# Patient Record
Sex: MALE | Race: WHITE | NOT HISPANIC OR LATINO | Employment: STUDENT | URBAN - METROPOLITAN AREA
[De-identification: names, ages, dates, MRNs, and addresses within clinical notes are randomized per-mention and may not be internally consistent; named-entity substitution may affect disease eponyms.]

---
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
## 2022-10-12 ENCOUNTER — OFFICE VISIT (OUTPATIENT)
Dept: PEDIATRICS CLINIC | Age: 22
End: 2022-10-12
Payer: COMMERCIAL

## 2022-10-12 VITALS
DIASTOLIC BLOOD PRESSURE: 80 MMHG | TEMPERATURE: 98.8 F | BODY MASS INDEX: 26.14 KG/M2 | SYSTOLIC BLOOD PRESSURE: 120 MMHG | WEIGHT: 177 LBS

## 2022-10-12 DIAGNOSIS — J02.9 SORE THROAT: Primary | ICD-10-CM

## 2022-10-12 LAB — S PYO AG THROAT QL: NEGATIVE

## 2022-10-12 PROCEDURE — 87880 STREP A ASSAY W/OPTIC: CPT | Performed by: PEDIATRICS

## 2022-10-12 PROCEDURE — 99213 OFFICE O/P EST LOW 20 MIN: CPT | Performed by: PEDIATRICS

## 2022-10-12 NOTE — PROGRESS NOTES
Assessment/Plan:  The rapid strep was negative  Throat culture is pending  Supportive care is recommended  Follow up prn  Needs to have a well visit  Diagnoses and all orders for this visit:    Sore throat  -     POCT rapid strepA  -     Throat culture          Subjective:      Patient ID: Jasmine Rivas is a 23 y o  male  Fever  This is a new problem  The current episode started yesterday  Associated symptoms include anorexia, chills, congestion, coughing, a fever (99 7 was Tmax), myalgias, nausea and a sore throat  Pertinent negatives include no arthralgias, change in bowel habit, headaches, urinary symptoms or vomiting  Associated symptoms comments: Home Covid test was negative    Nothing aggravates the symptoms  He has tried acetaminophen for the symptoms  The treatment provided moderate relief  The following portions of the patient's history were reviewed and updated as appropriate:   He  has a past medical history of Anorexia nervosa (9/18/2017) and Hematuria (7/18/2019)  He   Patient Active Problem List    Diagnosis Date Noted   • Acute nonintractable headache 01/27/2020   • Neck pain 01/27/2020   • Hematuria 07/18/2019   • Pharyngitis 02/01/2016   • Tourette's syndrome 08/03/2015   • Eating disorder 07/30/2015   • Hyperlipidemia 07/19/2014     He  has a past surgical history that includes Circumcision  His family history includes Diabetes in his father; Glomerulonephritis in his other; Hypertension in his father; Nephrolithiasis in his paternal aunt; No Known Problems in his mother and sister  He  reports that he has never smoked  He has never used smokeless tobacco  No history on file for alcohol use and drug use  No current outpatient medications on file  No current facility-administered medications for this visit  No current outpatient medications on file prior to visit  No current facility-administered medications on file prior to visit       He is allergic to amoxicillin-pot clavulanate       Review of Systems   Constitutional: Positive for chills and fever (99 7 was Tmax)  HENT: Positive for congestion and sore throat  Respiratory: Positive for cough  Gastrointestinal: Positive for anorexia and nausea  Negative for change in bowel habit and vomiting  Musculoskeletal: Positive for myalgias  Negative for arthralgias  Neurological: Negative for headaches  Objective:    Results for orders placed or performed in visit on 10/12/22   POCT rapid strepA   Result Value Ref Range     RAPID STREP A Negative Negative       /80 (BP Location: Left arm, Patient Position: Sitting, Cuff Size: Standard)   Temp 98 8 °F (37 1 °C) (Temporal)   Wt 80 3 kg (177 lb)   BMI 26 14 kg/m²          Physical Exam  Constitutional:       General: He is not in acute distress  Appearance: Normal appearance  He is well-developed  He is not ill-appearing  HENT:      Head: Normocephalic  Right Ear: Tympanic membrane and external ear normal       Left Ear: Tympanic membrane and external ear normal       Nose: Nose normal       Mouth/Throat:      Mouth: Mucous membranes are moist       Pharynx: Oropharyngeal exudate and posterior oropharyngeal erythema present  Eyes:      General:         Right eye: No discharge  Left eye: No discharge  Conjunctiva/sclera: Conjunctivae normal       Pupils: Pupils are equal, round, and reactive to light  Cardiovascular:      Rate and Rhythm: Normal rate and regular rhythm  Heart sounds: Normal heart sounds  No murmur heard  Pulmonary:      Effort: Pulmonary effort is normal  No respiratory distress  Breath sounds: Normal breath sounds  No wheezing or rales  Abdominal:      General: Bowel sounds are normal  There is no distension  Palpations: Abdomen is soft  There is no mass  Tenderness: There is no abdominal tenderness  There is no guarding     Musculoskeletal:      Cervical back: Normal range of motion and neck supple  Tenderness present  Lymphadenopathy:      Cervical: No cervical adenopathy  Skin:     General: Skin is warm  Neurological:      Mental Status: He is alert

## 2022-10-15 LAB — B-HEM STREP SPEC QL CULT: NEGATIVE

## 2022-12-21 ENCOUNTER — OFFICE VISIT (OUTPATIENT)
Dept: PEDIATRICS CLINIC | Age: 22
End: 2022-12-21

## 2022-12-21 VITALS
BODY MASS INDEX: 26.29 KG/M2 | WEIGHT: 178 LBS | SYSTOLIC BLOOD PRESSURE: 120 MMHG | TEMPERATURE: 97.5 F | DIASTOLIC BLOOD PRESSURE: 78 MMHG

## 2022-12-21 DIAGNOSIS — J02.8 PHARYNGITIS DUE TO OTHER ORGANISM: ICD-10-CM

## 2022-12-21 DIAGNOSIS — U07.1 COVID-19 VIRUS INFECTION: Primary | ICD-10-CM

## 2022-12-21 PROBLEM — R51.9 ACUTE NONINTRACTABLE HEADACHE: Status: RESOLVED | Noted: 2020-01-27 | Resolved: 2022-12-21

## 2022-12-21 NOTE — PROGRESS NOTES
Assessment/Plan:Brice has Covid currently  He may return to normal activities after 5 days of quarantine  Follow up prn  Diagnoses and all orders for this visit:    COVID-19 virus infection    Pharyngitis due to other organism          Subjective:      Patient ID: Diogo Perez is a 23 y o  male  Fever - 9 weeks to 74 years   This is a new problem  Episode onset: 3 days ago  The problem has been resolved  His temperature was unmeasured prior to arrival  Associated symptoms include congestion, coughing, headaches, muscle aches and a sore throat  Pertinent negatives include no abdominal pain, diarrhea, ear pain, nausea, urinary pain or vomiting  Associated symptoms comments: Chills  Covid home test was positive    He has tried acetaminophen for the symptoms  The treatment provided significant relief  The following portions of the patient's history were reviewed and updated as appropriate:   He  has a past medical history of Acute nonintractable headache (1/27/2020), Anorexia nervosa (9/18/2017), and Hematuria (7/18/2019)  He   Patient Active Problem List    Diagnosis Date Noted   • COVID-19 virus infection 12/21/2022   • Neck pain 01/27/2020   • Hematuria 07/18/2019   • Pharyngitis 02/01/2016   • Tourette's syndrome 08/03/2015   • Eating disorder 07/30/2015   • Hyperlipidemia 07/19/2014     He  has a past surgical history that includes Circumcision  His family history includes Diabetes in his father; Glomerulonephritis in his other; Hypertension in his father; Nephrolithiasis in his paternal aunt; No Known Problems in his mother and sister  He  reports that he has never smoked  He has never used smokeless tobacco  No history on file for alcohol use and drug use  No current outpatient medications on file  No current facility-administered medications for this visit  No current outpatient medications on file prior to visit       No current facility-administered medications on file prior to visit  He is allergic to amoxicillin-pot clavulanate       Review of Systems   Constitutional: Positive for fever  Negative for appetite change  HENT: Positive for congestion and sore throat  Negative for ear pain  Respiratory: Positive for cough  Gastrointestinal: Negative for abdominal pain, diarrhea, nausea and vomiting  Genitourinary: Negative for dysuria  Neurological: Positive for headaches  Objective:      /78   Temp 97 5 °F (36 4 °C)   Wt 80 7 kg (178 lb)   BMI 26 29 kg/m²          Physical Exam  Constitutional:       General: He is not in acute distress  Appearance: Normal appearance  He is well-developed  He is not ill-appearing  HENT:      Head: Normocephalic  Right Ear: Tympanic membrane and external ear normal       Left Ear: Tympanic membrane and external ear normal       Nose: Nose normal       Mouth/Throat:      Mouth: Mucous membranes are moist       Pharynx: Oropharynx is clear  Posterior oropharyngeal erythema present  No oropharyngeal exudate  Eyes:      General:         Right eye: No discharge  Left eye: No discharge  Conjunctiva/sclera: Conjunctivae normal       Pupils: Pupils are equal, round, and reactive to light  Cardiovascular:      Rate and Rhythm: Normal rate and regular rhythm  Heart sounds: Normal heart sounds  No murmur heard  Pulmonary:      Effort: Pulmonary effort is normal  No respiratory distress  Breath sounds: Normal breath sounds  No wheezing or rales  Abdominal:      General: Bowel sounds are normal  There is no distension  Palpations: Abdomen is soft  There is no mass  Tenderness: There is no abdominal tenderness  There is no guarding  Musculoskeletal:      Cervical back: Normal range of motion and neck supple  Lymphadenopathy:      Cervical: No cervical adenopathy  Skin:     General: Skin is warm  Neurological:      Mental Status: He is alert